# Patient Record
Sex: FEMALE | Race: BLACK OR AFRICAN AMERICAN | Employment: UNEMPLOYED | URBAN - METROPOLITAN AREA
[De-identification: names, ages, dates, MRNs, and addresses within clinical notes are randomized per-mention and may not be internally consistent; named-entity substitution may affect disease eponyms.]

---

## 2017-02-23 RX ORDER — CELECOXIB 200 MG/1
200 CAPSULE ORAL DAILY
Qty: 30 CAP | Refills: 2 | Status: SHIPPED | OUTPATIENT
Start: 2017-02-23 | End: 2017-06-02 | Stop reason: SDUPTHER

## 2017-02-23 RX ORDER — HYDROCODONE BITARTRATE AND ACETAMINOPHEN 5; 325 MG/1; MG/1
1 TABLET ORAL
Qty: 60 TAB | Refills: 0 | Status: SHIPPED | OUTPATIENT
Start: 2017-02-23 | End: 2017-03-27 | Stop reason: SDUPTHER

## 2017-02-23 NOTE — TELEPHONE ENCOUNTER
Last Visit: 09/01/2016 with RAMAN Ortega    Next Appointment: noted to f/u after labs   Previous Refill Encounters: 12/16/2016 per RAMAN Noel #60; 04/15/2016 per RAMAN Ortega Celebrex #30 with 2 refills     Requested Prescriptions     Pending Prescriptions Disp Refills    HYDROcodone-acetaminophen (NORCO) 5-325 mg per tablet 60 Tab 0     Sig: Take 1 Tab by mouth every twelve (12) hours as needed for Pain. Max Daily Amount: 2 Tabs.  celecoxib (CELEBREX) 200 mg capsule 30 Cap 2     Sig: Take 1 Cap by mouth daily.  Indications: with food

## 2017-03-27 NOTE — TELEPHONE ENCOUNTER
Last Visit: 09/01/2016 with RAMAN Gonzales    Next Appointment: noted to f/u after labs   Previous Refill Encounters: 02/23/2017 per RAMAN Gonzales #60     Requested Prescriptions     Pending Prescriptions Disp Refills    HYDROcodone-acetaminophen (NORCO) 5-325 mg per tablet 60 Tab 0     Sig: Take 1 Tab by mouth every twelve (12) hours as needed for Pain. Max Daily Amount: 2 Tabs.  DO NOT FILL BEFORE 03/30/2017

## 2017-03-29 RX ORDER — HYDROCODONE BITARTRATE AND ACETAMINOPHEN 5; 325 MG/1; MG/1
1 TABLET ORAL
Qty: 60 TAB | Refills: 0 | Status: SHIPPED | OUTPATIENT
Start: 2017-03-30 | End: 2017-06-02 | Stop reason: SDUPTHER

## 2017-03-29 NOTE — TELEPHONE ENCOUNTER
Patient called ref message of 03/27/17. She needs her medication as it has been 48 hrs.  Please call her at [phone]

## 2017-05-08 NOTE — TELEPHONE ENCOUNTER
Last Visit: 09/01/2016 with RAMAN Lorenz    Next Appointment: noted to f/u after labs   Previous Refill Encounters: 03/30/2017 per RAMAN Lorenz Norco#60; 02/23/2017 per RAMAN Lorenz #30 with 2 refills     Requested Prescriptions     Pending Prescriptions Disp Refills    celecoxib (CELEBREX) 200 mg capsule 30 Cap 2     Sig: Take 1 Cap by mouth daily. Indications: with food    HYDROcodone-acetaminophen (NORCO) 5-325 mg per tablet 60 Tab 0     Sig: Take 1 Tab by mouth every twelve (12) hours as needed for Pain. Max Daily Amount: 2 Tabs.

## 2017-05-10 RX ORDER — CELECOXIB 200 MG/1
200 CAPSULE ORAL DAILY
Qty: 30 CAP | Refills: 2 | OUTPATIENT
Start: 2017-05-10

## 2017-05-10 RX ORDER — HYDROCODONE BITARTRATE AND ACETAMINOPHEN 5; 325 MG/1; MG/1
1 TABLET ORAL
Qty: 60 TAB | Refills: 0 | OUTPATIENT
Start: 2017-05-10

## 2017-06-02 ENCOUNTER — OFFICE VISIT (OUTPATIENT)
Dept: FAMILY MEDICINE CLINIC | Age: 62
End: 2017-06-02

## 2017-06-02 VITALS
OXYGEN SATURATION: 97 % | RESPIRATION RATE: 18 BRPM | TEMPERATURE: 98.5 F | BODY MASS INDEX: 46.32 KG/M2 | WEIGHT: 278 LBS | HEART RATE: 81 BPM | HEIGHT: 65 IN | DIASTOLIC BLOOD PRESSURE: 90 MMHG | SYSTOLIC BLOOD PRESSURE: 180 MMHG

## 2017-06-02 DIAGNOSIS — Z79.4 TYPE 2 DIABETES MELLITUS WITH HYPERGLYCEMIA, WITH LONG-TERM CURRENT USE OF INSULIN (HCC): ICD-10-CM

## 2017-06-02 DIAGNOSIS — E11.65 TYPE 2 DIABETES MELLITUS WITH HYPERGLYCEMIA, WITH LONG-TERM CURRENT USE OF INSULIN (HCC): ICD-10-CM

## 2017-06-02 DIAGNOSIS — R35.0 URINARY FREQUENCY: ICD-10-CM

## 2017-06-02 DIAGNOSIS — I10 ESSENTIAL HYPERTENSION: Primary | ICD-10-CM

## 2017-06-02 LAB — HBA1C MFR BLD HPLC: NORMAL %

## 2017-06-02 RX ORDER — INSULIN PUMP SYRINGE, 3 ML
EACH MISCELLANEOUS
Qty: 1 KIT | Refills: 0 | Status: SHIPPED | OUTPATIENT
Start: 2017-06-02

## 2017-06-02 RX ORDER — AMLODIPINE BESYLATE 5 MG/1
5 TABLET ORAL DAILY
Qty: 30 TAB | Refills: 0 | Status: SHIPPED | OUTPATIENT
Start: 2017-06-02

## 2017-06-02 RX ORDER — INSULIN GLARGINE 100 [IU]/ML
10 INJECTION, SOLUTION SUBCUTANEOUS DAILY
Qty: 5 PEN | Refills: 0 | Status: SHIPPED | OUTPATIENT
Start: 2017-06-02

## 2017-06-02 RX ORDER — LANCETS
EACH MISCELLANEOUS
Qty: 50 EACH | Refills: 11 | Status: SHIPPED | OUTPATIENT
Start: 2017-06-02

## 2017-06-02 NOTE — PROGRESS NOTES
HISTORY OF PRESENT ILLNESS  Thomas Soto is a 62 y.o. female. 6/2/2017  2:23 PM    Chief Complaint   Patient presents with    Elevated Blood Pressure     having headache    High Blood Sugar     the highest being 464 on 5/29/17 also urinary frequency and weakness       HPI: Here today to discuss elevated sugars and BP. PCP Dr Gardenia James. Reports that she has been in [place] since the fall 2016 and now is back in [place] for the summer. Was not seeing any providers in [place]. HTN: Taking Lisinopril/HCTZ daily as prescribed. Does not check BP at home. Denies any headaches, chest pain, SOB, or leg swelling. Diabetes: States that she is not currently taking anything for her diabetes. Was prescribed Metformin in the past but that it upset her stomach so she stopped taking it. Does not check her sugars at home but that her friend checked it for her since she was not feeling well and it was 464 on 5/29. She reports feeling weak, tired, and has been urinating frequently. Review of Systems   Constitutional: Positive for malaise/fatigue. Negative for chills and fever. Eyes: Negative for double vision, photophobia and pain. Respiratory: Negative for cough, shortness of breath and wheezing. Cardiovascular: Negative for chest pain, palpitations and leg swelling. Gastrointestinal: Negative for abdominal pain, constipation, diarrhea, nausea and vomiting. Genitourinary: Positive for frequency. Negative for dysuria and urgency. Musculoskeletal: Negative for back pain, joint pain and myalgias. Neurological: Positive for weakness. Negative for dizziness and headaches.         PHQ Screening   PHQ over the last two weeks 6/2/2017   Little interest or pleasure in doing things Not at all   Feeling down, depressed or hopeless Not at all   Total Score PHQ 2 0         History  Past Medical History:   Diagnosis Date    Diabetes mellitus ([facility] [address] 11/18/2010    Hypertension     Osteoarthritis 10/1/2010       Past Surgical History:   Procedure Laterality Date    HX HEENT      tonsillectomy as a child    HX ORTHOPAEDIC  1988    right hip replacement    HX ORTHOPAEDIC      rt knee surgery from trauma        Social History     Social History    Marital status:      Spouse name: N/A    Number of children: N/A    Years of education: N/A     Occupational History    Not on file. Social History Main Topics    Smoking status: Former Smoker    Smokeless tobacco: Not on file    Alcohol use No    Drug use: No    Sexual activity: No     Other Topics Concern    Not on file     Social History Narrative       No Known Allergies    Current Outpatient Prescriptions   Medication Sig Dispense Refill    lisinopril-hydrochlorothiazide (PRINZIDE, ZESTORETIC) 20-25 mg per tablet Take 1 Tab by mouth daily. 90 Tab 1    HYDROcodone-acetaminophen (NORCO) 5-325 mg per tablet Take 1 Tab by mouth every twelve (12) hours as needed for Pain. Max Daily Amount: 2 Tabs. 60 Tab 0    celecoxib (CELEBREX) 200 mg capsule Take 1 Cap by mouth daily. with food 30 Cap 2    Lancets Misc by Does Not Apply route two (2) times a day (before meals). 1 Package 2    Blood-Glucose Meter monitoring kit by Does Not Apply route two (2) times a day (before meals). Use for blood glucose check 1 Kit 0         Patient Care Team:  Patient Care Team:  Tigre Packer MD as PCP - General (Family Practice)        LABS:     Ref. Range 6/2/2017 14:00   Hemoglobin A1c (POC) Latest Units: % TO HIGH        RADIOLOGY:  None new to review      Physical Exam   Constitutional: She is oriented to person, place, and time. She appears well-developed and well-nourished. No distress. Cardiovascular: Normal rate, regular rhythm and normal heart sounds. No murmur heard. Pulmonary/Chest: Effort normal and breath sounds normal. No respiratory distress. Abdominal: Soft. Bowel sounds are normal. There is no tenderness.    Neurological: She is alert and oriented to person, place, and time. Skin: Skin is warm and dry. Vitals:    06/02/17 1405   BP: 180/85   Pulse: 81   Resp: 18   Temp: 98.5 °F (36.9 °C)   TempSrc: Oral   SpO2: 97%   Weight: 278 lb (126.1 kg)   Height: 5' 5\" (1.651 m)   PainSc:   0 - No pain     Recheck manual:   Visit Vitals    /90       ASSESSMENT and PLAN  Vasiliy Booker was seen today for elevated blood pressure and high blood sugar. Diagnoses and all orders for this visit:    Essential hypertension  *Uncontrolled HTN. Continue Lisinopril/HCTZ and add Norvasc. Advised on side effects. Follow up in 1 week for BP check and further orders. Check labs before visit. -     amLODIPine (NORVASC) 5 mg tablet; Take 1 Tab by mouth daily.  -     METABOLIC PANEL, COMPREHENSIVE; Future    Type 2 diabetes mellitus with hyperglycemia, with long-term current use of insulin ([address]  *Discussed with patient about management of diabetes. She has been intolerant of Metformin in the past. Due to reading of TOO HIGH on A1c machine, recommend insulin initiation. She is open to this. Will start with Lantus 10 units daily. *Rx sent for glucose monitor and testing equipment. Log given to patient and instructed to check daily in the AM. Bring log to next visit in 1 week. *Check labs before next visit. *Encouraged diabetic education classes. Declines. Given information on general diabetic lifestyle recommendations. -     insulin glargine (LANTUS SOLOSTAR) 100 unit/mL (3 mL) inpn; 10 Units by SubCUTAneous route daily.  -     Blood-Glucose Meter monitoring kit; Check glucose AM  -     Lancets misc; Check glucose daily before breakfast  -     glucose blood VI test strips (BLOOD GLUCOSE TEST) strip; Check glucose in the AM  -     Insulin Needles, Disposable, 30 gauge x 1/3\"; Use with lantus insulin daily  -     AMB POC HEMOGLOBIN S7U  -     METABOLIC PANEL, COMPREHENSIVE; Future  -     LIPID PANEL; Future  -     TSH 3RD GENERATION;  Future  -     MICROALBUMIN, UR, RAND W/ MICROALBUMIN/CREA RATIO; Future    Urinary frequency  *Unable to urinate in office. Will add to general labs. Most likely due to uncontrolled DM.   -     URINALYSIS W/MICROSCOPIC; Future      *Plan of care reviewed with patient. Patient in agreement with plan and expresses understanding. All questions answered and patient encouraged to call or RTO if further questions or concerns. Follow-up Disposition:  Return in about 1 week (around 6/9/2017) for follow up with your PCP. Sidney Barthel

## 2017-06-02 NOTE — PATIENT INSTRUCTIONS

## 2017-06-02 NOTE — MR AVS SNAPSHOT
Visit Information Date & Time Provider Department Dept. Phone Encounter #  
 6/2/2017  1:45 PM Deisy Elkins NP [facility] [phone] [id number] Follow-up Instructions Return in about 1 week (around 6/9/2017) for follow up with your PCP. Sidney Barthel Your Appointments 6/8/2017  7:30 AM  
Follow Up with Brenda Durham MD  
[facility] - [facility] [address]) Appt Note: LFT KNEE/HIP FU; PT CALLED AND [address] APPT.; .  
 [address] [facility], [address] [facility] [address] [phone]  
  
   
 [address] [facility], [address] [place] [address] Upcoming Health Maintenance Date Due Hepatitis C Screening 1955 FOOT EXAM Q1 10/31/1966 MICROALBUMIN Q1 10/31/1966 EYE EXAM RETINAL OR DILATED Q1 10/31/1966 Pneumococcal 19-64 Medium Risk (1 of 1 - PPSV23) 10/31/1975 PAP AKA CERVICAL CYTOLOGY 10/31/1977 BREAST CANCER SCRN MAMMOGRAM 10/31/2006 FOBT Q 1 YEAR AGE 50-75 10/31/2006 ZOSTER VACCINE AGE 60> 10/31/2016 HEMOGLOBIN A1C Q6M 3/2/2017 INFLUENZA AGE 9 TO ADULT 8/1/2017 LIPID PANEL Q1 9/12/2017 DTaP/Tdap/Td series (2 - Td) 10/1/2020 Allergies as of 6/2/2017  Review Complete On: 6/2/2017 By: Deisy Elkins NP No Known Allergies Current Immunizations  Reviewed on 11/16/2010 Name Date TDAP Vaccine 10/1/2010 Not reviewed this visit You Were Diagnosed With   
  
 Codes Comments Essential hypertension    -  Primary ICD-10-CM: I10 
ICD-9-CM: 401.9 Type 2 diabetes mellitus with hyperglycemia, with long-term current use of insulin (HCC)     ICD-10-CM: E11.65, Z79.4 ICD-9-CM: 250.00, 790.29, V58.67 Urinary frequency     ICD-10-CM: R35.0 ICD-9-CM: 788.41 Vitals BP Pulse Temp Resp Height(growth percentile) Weight(growth percentile) 180/90 81 98.5 °F (36.9 °C) (Oral) 18 5' 5\" (1.651 m) 278 lb (126.1 kg) SpO2 BMI OB Status Smoking Status 97% 46.26 kg/m2 Postmenopausal Former Smoker Vitals History BMI and BSA Data Body Mass Index Body Surface Area  
 46.26 kg/m 2 2.4 m 2 Preferred Pharmacy Pharmacy Name Phone [facility] [address] [phone] Your Updated Medication List  
  
   
This list is accurate as of: 6/2/17  2:50 PM.  Always use your most recent med list. amLODIPine 5 mg tablet Commonly known as:  Louie Carlos Take 1 Tab by mouth daily. Blood-Glucose Meter monitoring kit Check glucose AM  
  
 celecoxib 200 mg capsule Commonly known as:  CeleBREX Take 1 Cap by mouth daily. with food  
  
 glucose blood VI test strips strip Commonly known as:  blood glucose test  
Check glucose in the AM  
  
 HYDROcodone-acetaminophen 5-325 mg per tablet Commonly known as:  Ceil Heap Take 1 Tab by mouth every twelve (12) hours as needed for Pain. Max Daily Amount: 2 Tabs. insulin glargine 100 unit/mL (3 mL) Inpn Commonly known as:  LANTUS SOLOSTAR  
10 Units by SubCUTAneous route daily. Insulin Needles (Disposable) 30 gauge x 1/3\" Use with lantus insulin daily Lancets Misc Check glucose daily before breakfast  
  
 lisinopril-hydroCHLOROthiazide 20-25 mg per tablet Commonly known as:  Wonda Cone Take 1 Tab by mouth daily. Prescriptions Sent to Pharmacy Refills  
 insulin glargine (LANTUS SOLOSTAR) 100 unit/mL (3 mL) inpn 0 Sig: 10 Units by SubCUTAneous route daily. Class: Normal  
 Pharmacy: [facility] [address] [place] [address] Ph #: [phone] Route: SubCUTAneous  
 amLODIPine (NORVASC) 5 mg tablet 0 Sig: Take 1 Tab by mouth daily. Class: Normal  
 Pharmacy: [facility] [address] [place] [address] Ph #: [phone]  Route: Oral  
 Blood-Glucose Meter monitoring kit 0 Sig: Check glucose AM  
 Class: Normal  
 Pharmacy: [facility] [address] [place], [place] [address] [address] [place] [address] Ph #: [phone] Lancets misc 11 Sig: Check glucose daily before breakfast  
 Class: Normal  
 Pharmacy: [facility] [address] [place] [address], [address] [place] [address] Ph #: [phone]  
 glucose blood VI test strips (BLOOD GLUCOSE TEST) strip 11 Sig: Check glucose in the AM  
 Class: Normal  
 Pharmacy: [facility] [address] [place], [place] [address] [address] [place] [address] Ph #: [phone] Insulin Needles, Disposable, 30 gauge x 1/3\" 11 Sig: Use with lantus insulin daily Class: Normal  
 Pharmacy: [facility] [address] [place], [place] [address] [address] [place] [address] Ph #: [phone] We Performed the Following AMB POC HEMOGLOBIN A1C [22413 CPT(R)] Follow-up Instructions Return in about 1 week (around 6/9/2017) for follow up with your PCP. Ayan Vaughan To-Do List   
 Around 06/03/2017 Lab:  LIPID PANEL Around 06/03/2017 Lab:  METABOLIC PANEL, COMPREHENSIVE   
  
 06/03/2017 Lab:  MICROALBUMIN, UR, RAND W/ MICROALBUMIN/CREA RATIO Around 06/03/2017 Lab:  TSH 3RD GENERATION Around 06/03/2017 Lab:  URINALYSIS W/MICROSCOPIC Introducing [facility]! [employer] introduces [employer] patient portal. Now you can access parts of your medical record, email your doctor's office, and request medication refills online. 1. In your internet browser, go to [email or web address] 2. Click on the First Time User? Click Here link in the Sign In box. You will see the New Member Sign Up page. 3. Enter your [employer] Access Code exactly as it appears below. You will not need to use this code after youve completed the sign-up process.  If you do not sign up before the expiration date, you must request a new code. · [facility] Access Code: [email or web address] Expires: 8/31/2017 12:53 PM 
 
4. Enter the last four digits of your Social Security Number (xxxx) and Date of Birth (mm/dd/yyyy) as indicated and click Submit. You will be taken to the next sign-up page. 5. Create a [facility] ID. This will be your [facility] login ID and cannot be changed, so think of one that is secure and easy to remember. 6. Create a [facility] password. You can change your password at any time. 7. Enter your Password Reset Question and Answer. This can be used at a later time if you forget your password. 8. Enter your e-mail address. You will receive e-mail notification when new information is available in [address] 9. Click Sign Up. You can now view and download portions of your medical record. 10. Click the Download Summary menu link to download a portable copy of your medical information. If you have questions, please visit the Frequently Asked Questions section of the [facility] website. Remember, [facility] is NOT to be used for urgent needs. For medical emergencies, dial 911. Now available from your iPhone and Android! Please provide this summary of care documentation to your next provider. Your primary care clinician is listed as Tristian Robert. If you have any questions after today's visit, please call [phone].

## 2017-06-03 DIAGNOSIS — R35.0 URINARY FREQUENCY: ICD-10-CM

## 2017-06-03 DIAGNOSIS — Z79.4 TYPE 2 DIABETES MELLITUS WITH HYPERGLYCEMIA, WITH LONG-TERM CURRENT USE OF INSULIN (HCC): ICD-10-CM

## 2017-06-03 DIAGNOSIS — E11.65 TYPE 2 DIABETES MELLITUS WITH HYPERGLYCEMIA, WITH LONG-TERM CURRENT USE OF INSULIN (HCC): ICD-10-CM

## 2017-06-03 DIAGNOSIS — I10 ESSENTIAL HYPERTENSION: ICD-10-CM

## 2024-12-11 ENCOUNTER — APPOINTMENT (OUTPATIENT)
Facility: HOSPITAL | Age: 69
End: 2024-12-11
Payer: MEDICARE

## 2024-12-11 ENCOUNTER — HOSPITAL ENCOUNTER (EMERGENCY)
Facility: HOSPITAL | Age: 69
Discharge: HOME | End: 2024-12-11
Payer: MEDICARE

## 2024-12-11 VITALS
HEART RATE: 94 BPM | RESPIRATION RATE: 18 BRPM | OXYGEN SATURATION: 95 % | TEMPERATURE: 98.1 F | SYSTOLIC BLOOD PRESSURE: 156 MMHG | WEIGHT: 260 LBS | DIASTOLIC BLOOD PRESSURE: 60 MMHG | BODY MASS INDEX: 43.32 KG/M2 | HEIGHT: 65 IN

## 2024-12-11 DIAGNOSIS — E87.1 HYPONATREMIA: ICD-10-CM

## 2024-12-11 DIAGNOSIS — J11.1 INFLUENZA: Primary | ICD-10-CM

## 2024-12-11 DIAGNOSIS — E87.6 HYPOKALEMIA: ICD-10-CM

## 2024-12-11 DIAGNOSIS — N17.9 AKI (ACUTE KIDNEY INJURY) (HCC): ICD-10-CM

## 2024-12-11 LAB
ALBUMIN SERPL-MCNC: 3.2 G/DL (ref 3.4–5)
ALBUMIN/GLOB SERPL: 0.6 (ref 0.8–1.7)
ALP SERPL-CCNC: 78 U/L (ref 45–117)
ALT SERPL-CCNC: 22 U/L (ref 13–56)
ANION GAP SERPL CALC-SCNC: 8 MMOL/L (ref 3–18)
ANION GAP SERPL CALC-SCNC: 9 MMOL/L (ref 3–18)
AST SERPL-CCNC: 48 U/L (ref 10–38)
BASOPHILS # BLD: 0 K/UL (ref 0–0.1)
BASOPHILS NFR BLD: 0 % (ref 0–2)
BILIRUB SERPL-MCNC: 0.6 MG/DL (ref 0.2–1)
BUN SERPL-MCNC: 34 MG/DL (ref 7–18)
BUN SERPL-MCNC: 34 MG/DL (ref 7–18)
BUN/CREAT SERPL: 18 (ref 12–20)
BUN/CREAT SERPL: 23 (ref 12–20)
CALCIUM SERPL-MCNC: 7 MG/DL (ref 8.5–10.1)
CALCIUM SERPL-MCNC: 9.1 MG/DL (ref 8.5–10.1)
CHLORIDE SERPL-SCNC: 100 MMOL/L (ref 100–111)
CHLORIDE SERPL-SCNC: 109 MMOL/L (ref 100–111)
CO2 SERPL-SCNC: 21 MMOL/L (ref 21–32)
CO2 SERPL-SCNC: 22 MMOL/L (ref 21–32)
CREAT SERPL-MCNC: 1.47 MG/DL (ref 0.6–1.3)
CREAT SERPL-MCNC: 1.86 MG/DL (ref 0.6–1.3)
DIFFERENTIAL METHOD BLD: ABNORMAL
EKG ATRIAL RATE: 92 BPM
EKG DIAGNOSIS: NORMAL
EKG P-R INTERVAL: 142 MS
EKG Q-T INTERVAL: 374 MS
EKG QRS DURATION: 76 MS
EKG QTC CALCULATION (BAZETT): 462 MS
EKG R AXIS: -77 DEGREES
EKG T AXIS: -33 DEGREES
EKG VENTRICULAR RATE: 92 BPM
EOSINOPHIL # BLD: 0 K/UL (ref 0–0.4)
EOSINOPHIL NFR BLD: 0 % (ref 0–5)
ERYTHROCYTE [DISTWIDTH] IN BLOOD BY AUTOMATED COUNT: 15 % (ref 11.6–14.5)
FLUAV RNA SPEC QL NAA+PROBE: DETECTED
FLUBV RNA SPEC QL NAA+PROBE: NOT DETECTED
GLOBULIN SER CALC-MCNC: 5.7 G/DL (ref 2–4)
GLUCOSE SERPL-MCNC: 95 MG/DL (ref 74–99)
GLUCOSE SERPL-MCNC: 98 MG/DL (ref 74–99)
HCT VFR BLD AUTO: 50.2 % (ref 35–45)
HGB BLD-MCNC: 15.9 G/DL (ref 12–16)
IMM GRANULOCYTES # BLD AUTO: 0 K/UL (ref 0–0.04)
IMM GRANULOCYTES NFR BLD AUTO: 1 % (ref 0–0.5)
LYMPHOCYTES # BLD: 1.1 K/UL (ref 0.9–3.6)
LYMPHOCYTES NFR BLD: 13 % (ref 21–52)
MCH RBC QN AUTO: 28 PG (ref 24–34)
MCHC RBC AUTO-ENTMCNC: 31.7 G/DL (ref 31–37)
MCV RBC AUTO: 88.4 FL (ref 78–100)
MONOCYTES # BLD: 1.1 K/UL (ref 0.05–1.2)
MONOCYTES NFR BLD: 14 % (ref 3–10)
NEUTS SEG # BLD: 5.8 K/UL (ref 1.8–8)
NEUTS SEG NFR BLD: 72 % (ref 40–73)
NRBC # BLD: 0 K/UL (ref 0–0.01)
NRBC BLD-RTO: 0 PER 100 WBC
NT PRO BNP: 517 PG/ML (ref 0–900)
PLATELET # BLD AUTO: 178 K/UL (ref 135–420)
PMV BLD AUTO: 10.1 FL (ref 9.2–11.8)
POTASSIUM SERPL-SCNC: 3.4 MMOL/L (ref 3.5–5.5)
POTASSIUM SERPL-SCNC: 5.9 MMOL/L (ref 3.5–5.5)
PROT SERPL-MCNC: 8.9 G/DL (ref 6.4–8.2)
RBC # BLD AUTO: 5.68 M/UL (ref 4.2–5.3)
SARS-COV-2 RNA RESP QL NAA+PROBE: NOT DETECTED
SODIUM SERPL-SCNC: 130 MMOL/L (ref 136–145)
SODIUM SERPL-SCNC: 139 MMOL/L (ref 136–145)
SOURCE: ABNORMAL
TROPONIN I SERPL HS-MCNC: 23 NG/L (ref 0–54)
WBC # BLD AUTO: 8 K/UL (ref 4.6–13.2)

## 2024-12-11 PROCEDURE — 85025 COMPLETE CBC W/AUTO DIFF WBC: CPT

## 2024-12-11 PROCEDURE — 83880 ASSAY OF NATRIURETIC PEPTIDE: CPT

## 2024-12-11 PROCEDURE — 80053 COMPREHEN METABOLIC PANEL: CPT

## 2024-12-11 PROCEDURE — 6360000002 HC RX W HCPCS: Performed by: EMERGENCY MEDICINE

## 2024-12-11 PROCEDURE — 93005 ELECTROCARDIOGRAM TRACING: CPT | Performed by: EMERGENCY MEDICINE

## 2024-12-11 PROCEDURE — 94640 AIRWAY INHALATION TREATMENT: CPT

## 2024-12-11 PROCEDURE — 93010 ELECTROCARDIOGRAM REPORT: CPT | Performed by: INTERNAL MEDICINE

## 2024-12-11 PROCEDURE — 6370000000 HC RX 637 (ALT 250 FOR IP): Performed by: EMERGENCY MEDICINE

## 2024-12-11 PROCEDURE — 96374 THER/PROPH/DIAG INJ IV PUSH: CPT

## 2024-12-11 PROCEDURE — 6370000000 HC RX 637 (ALT 250 FOR IP)

## 2024-12-11 PROCEDURE — 94761 N-INVAS EAR/PLS OXIMETRY MLT: CPT

## 2024-12-11 PROCEDURE — 71045 X-RAY EXAM CHEST 1 VIEW: CPT

## 2024-12-11 PROCEDURE — 96361 HYDRATE IV INFUSION ADD-ON: CPT

## 2024-12-11 PROCEDURE — 87636 SARSCOV2 & INF A&B AMP PRB: CPT

## 2024-12-11 PROCEDURE — 99285 EMERGENCY DEPT VISIT HI MDM: CPT

## 2024-12-11 PROCEDURE — 2580000003 HC RX 258: Performed by: EMERGENCY MEDICINE

## 2024-12-11 PROCEDURE — 84484 ASSAY OF TROPONIN QUANT: CPT

## 2024-12-11 RX ORDER — DEXAMETHASONE SODIUM PHOSPHATE 4 MG/ML
10 INJECTION, SOLUTION INTRA-ARTICULAR; INTRALESIONAL; INTRAMUSCULAR; INTRAVENOUS; SOFT TISSUE
Status: COMPLETED | OUTPATIENT
Start: 2024-12-11 | End: 2024-12-11

## 2024-12-11 RX ORDER — IPRATROPIUM BROMIDE AND ALBUTEROL SULFATE 2.5; .5 MG/3ML; MG/3ML
1 SOLUTION RESPIRATORY (INHALATION)
Status: COMPLETED | OUTPATIENT
Start: 2024-12-11 | End: 2024-12-11

## 2024-12-11 RX ORDER — POTASSIUM CHLORIDE 750 MG/1
10 TABLET, EXTENDED RELEASE ORAL 2 TIMES DAILY
Qty: 180 TABLET | Refills: 0 | Status: SHIPPED | OUTPATIENT
Start: 2024-12-11

## 2024-12-11 RX ORDER — 0.9 % SODIUM CHLORIDE 0.9 %
1000 INTRAVENOUS SOLUTION INTRAVENOUS ONCE
Status: COMPLETED | OUTPATIENT
Start: 2024-12-11 | End: 2024-12-11

## 2024-12-11 RX ORDER — ALBUTEROL SULFATE 0.83 MG/ML
2.5 SOLUTION RESPIRATORY (INHALATION)
Status: COMPLETED | OUTPATIENT
Start: 2024-12-11 | End: 2024-12-11

## 2024-12-11 RX ADMIN — IPRATROPIUM BROMIDE AND ALBUTEROL SULFATE 1 DOSE: .5; 3 SOLUTION RESPIRATORY (INHALATION) at 16:58

## 2024-12-11 RX ADMIN — ALBUTEROL SULFATE 2.5 MG: 2.5 SOLUTION RESPIRATORY (INHALATION) at 16:58

## 2024-12-11 RX ADMIN — SODIUM CHLORIDE 1000 ML: 9 INJECTION, SOLUTION INTRAVENOUS at 17:02

## 2024-12-11 RX ADMIN — DEXAMETHASONE SODIUM PHOSPHATE 10 MG: 4 INJECTION INTRA-ARTICULAR; INTRALESIONAL; INTRAMUSCULAR; INTRAVENOUS; SOFT TISSUE at 16:18

## 2024-12-11 RX ADMIN — POTASSIUM BICARBONATE 20 MEQ: 782 TABLET, EFFERVESCENT ORAL at 20:07

## 2024-12-11 ASSESSMENT — ENCOUNTER SYMPTOMS
WHEEZING: 1
EYES NEGATIVE: 1
GASTROINTESTINAL NEGATIVE: 1
ALLERGIC/IMMUNOLOGIC NEGATIVE: 1

## 2024-12-11 ASSESSMENT — PAIN - FUNCTIONAL ASSESSMENT: PAIN_FUNCTIONAL_ASSESSMENT: NONE - DENIES PAIN

## 2024-12-11 NOTE — ED PROVIDER NOTES
6:10 PM :Pt care assumed from Chuck MATHIAS  , ED provider. Pt complaint(s), current treatment plan, progression and available diagnostic results have been discussed thoroughly. The patient was seen and evaluated on my shift.   Rounding occurred: No   Intended Disposition: TBD  Pending diagnostic reports and/or labs (please list): trop and repeat BMP     6:20 PM  Evaluated patient she is resting comfortably at this time still repeat any lab work.    Patient basic metabolic panel showed marked improvement with a creatinine of 1.47, and a GFR of 39.  Patient was encouraged to continue symptomatic management, and close follow-up with nephrology.  Patient's symptoms could Alcides prerenal.  She was hemodynamically stable at time of discharge.  Patient also encouraged to follow-up with primary care doctor and return to the ER immediately worsening or developing symptoms     Valarie Rosales PA-C  12/11/24 2007

## 2024-12-11 NOTE — ED PROVIDER NOTES
[facility] EMERGENCY DEPT  EMERGENCY DEPARTMENT ENCOUNTER      Pt Name: Valentina Pretty  MRN: [id number]  Birthdate 1955  Date of evaluation: 12/11/2024  Provider: FANNY Gandara  5:41 PM    CHIEF COMPLAINT     No chief complaint on file.        HISTORY OF PRESENT ILLNESS    Valentina Pretty is a 69 y.o. female who presents to the emergency department with a complaint of fatigue for the past 3 to 4 days not feeling well despite her daughter coming over and checking on her.  Denies abdominal pain nausea vomiting chest discomfort.  She has been wheezing and has a history of COPD.  Denies dysuria.  She does admit to some diarrheal stools denies melena hematochezia prior abdominal surgeries.  Denies any new medications.  Her appetites been poor over the past 3 days but has had very little energy.    HPI    Nursing Notes were reviewed.    REVIEW OF SYSTEMS       Review of Systems   Constitutional:  Positive for activity change, appetite change and fatigue.   HENT: Negative.     Eyes: Negative.    Respiratory:  Positive for wheezing.    Cardiovascular: Negative.    Gastrointestinal: Negative.    Endocrine: Negative.    Genitourinary: Negative.    Musculoskeletal: Negative.    Skin: Negative.    Allergic/Immunologic: Negative.    Neurological: Negative.    Hematological: Negative.    Psychiatric/Behavioral:  Positive for sleep disturbance.        Except as noted above the remainder of the review of systems was reviewed and negative.       PAST MEDICAL HISTORY   No past medical history on file.      SURGICAL HISTORY     No past surgical history on file.      CURRENT MEDICATIONS       Previous Medications    No medications on file       ALLERGIES     Patient has no allergy information on record.    FAMILY HISTORY     No family history on file.       SOCIAL HISTORY       Social History     Socioeconomic History    Marital status:        SCREENINGS                               CIWA Assessment  BP: (!) 156/60  Pulse:  as her symptoms have been present for over 3 days.    She is comfortable.  She is with her daughter at bedside.  Plan is to repeat collect the troponin and the chemistries give IV fluids and plan for discharge.  Chronic CKD as noted on care everywhere.  Do not see that she is followed by nephrology will give recommendation.    Medical Decision Making  Amount and/or Complexity of Data Reviewed  Labs: ordered.  Radiology: ordered.  ECG/medicine tests: ordered.    Risk  Prescription drug management.      5:41 PM : Pt care transferred to FANNY Boone,ED provider. History of patient complaint(s), available diagnostic reports and current treatment plan has been discussed thoroughly.   Bedside rounding on patient occured : Yes  Intended disposition of patient : home  Pending diagnostics reports and/or labs (please list): troponin, BMP, IVF      REASSESSMENT        FINAL IMPRESSION      1. Influenza    2. Hyponatremia    3. Stage 4 chronic kidney disease (HCC)          DISPOSITION/PLAN   DISPOSITION                 PATIENT REFERRED TO:  Yocasta Bermudez MD  [address] [facility] [address] [phone]          [facility] EMERGENCY DEPT  [address] [place] [address]  [phone]    If symptoms worsen or unable to obtain follow up, return immediately    Jus Bansal MD  [address] [id number] [phone]            DISCHARGE MEDICATIONS:  New Prescriptions    No medications on file     Controlled Substances Monitoring:          No data to display                (Please note that portions of this note were completed with a voice recognition program.  Efforts were made to edit the dictations but occasionally words are mis-transcribed.)    FANNY Gandara (electronically signed)  Attending Emergency Physician           Mary Guillaume PA  12/11/24 [id number]

## 2025-06-05 ENCOUNTER — TRANSCRIBE ORDERS (OUTPATIENT)
Facility: HOSPITAL | Age: 70
End: 2025-06-05

## 2025-06-05 DIAGNOSIS — Z78.0 POSTMENOPAUSAL: Primary | ICD-10-CM

## 2025-06-05 DIAGNOSIS — Z12.31 VISIT FOR SCREENING MAMMOGRAM: ICD-10-CM
